# Patient Record
Sex: FEMALE | Race: WHITE | HISPANIC OR LATINO | URBAN - METROPOLITAN AREA
[De-identification: names, ages, dates, MRNs, and addresses within clinical notes are randomized per-mention and may not be internally consistent; named-entity substitution may affect disease eponyms.]

---

## 2021-04-13 ENCOUNTER — APPOINTMENT (RX ONLY)
Dept: URBAN - METROPOLITAN AREA CLINIC 38 | Facility: CLINIC | Age: 31
Setting detail: DERMATOLOGY
End: 2021-04-13

## 2021-04-13 DIAGNOSIS — L81.4 OTHER MELANIN HYPERPIGMENTATION: ICD-10-CM | Status: STABLE

## 2021-04-13 DIAGNOSIS — D22 MELANOCYTIC NEVI: ICD-10-CM

## 2021-04-13 PROBLEM — D22.9 MELANOCYTIC NEVI, UNSPECIFIED: Status: ACTIVE | Noted: 2021-04-13

## 2021-04-13 PROCEDURE — 99203 OFFICE O/P NEW LOW 30 MIN: CPT

## 2021-04-13 PROCEDURE — ? ADDITIONAL NOTES

## 2021-04-13 PROCEDURE — ? PRESCRIPTION

## 2021-04-13 PROCEDURE — ? COUNSELING

## 2021-04-13 PROCEDURE — ? TREATMENT REGIMEN

## 2021-04-13 RX ORDER — FLUOCINOLONE ACETONIDE, HYDROQUINONE, AND TRETINOIN .1; 40; .5 MG/G; MG/G; MG/G
CREAM TOPICAL QHS
Qty: 30 | Refills: 0 | Status: ERX | COMMUNITY
Start: 2021-04-13

## 2021-04-13 RX ADMIN — FLUOCINOLONE ACETONIDE, HYDROQUINONE, AND TRETINOIN: .1; 40; .5 CREAM TOPICAL at 00:00

## 2021-04-13 ASSESSMENT — LOCATION ZONE DERM: LOCATION ZONE: FACE

## 2021-04-13 ASSESSMENT — LOCATION DETAILED DESCRIPTION DERM: LOCATION DETAILED: LEFT CENTRAL MALAR CHEEK

## 2021-04-13 ASSESSMENT — LOCATION SIMPLE DESCRIPTION DERM: LOCATION SIMPLE: LEFT CHEEK

## 2021-04-13 NOTE — PROCEDURE: ADDITIONAL NOTES
Additional Notes: Discussed treatment options
Detail Level: Simple
Render Risk Assessment In Note?: no